# Patient Record
Sex: MALE | URBAN - METROPOLITAN AREA
[De-identification: names, ages, dates, MRNs, and addresses within clinical notes are randomized per-mention and may not be internally consistent; named-entity substitution may affect disease eponyms.]

---

## 2020-08-07 ENCOUNTER — APPOINTMENT (RX ONLY)
Dept: URBAN - METROPOLITAN AREA CLINIC 23 | Facility: CLINIC | Age: 45
Setting detail: DERMATOLOGY
End: 2020-08-07

## 2020-08-07 VITALS — TEMPERATURE: 97.2 F

## 2020-08-07 DIAGNOSIS — Z41.9 ENCOUNTER FOR PROCEDURE FOR PURPOSES OTHER THAN REMEDYING HEALTH STATE, UNSPECIFIED: ICD-10-CM

## 2020-08-07 PROCEDURE — ? PULSED-DYE LASER

## 2020-08-07 PROCEDURE — ? IN-HOUSE DISPENSING PHARMACY

## 2020-08-07 PROCEDURE — ? FILLERS

## 2020-08-07 NOTE — PROCEDURE: FILLERS
Additional Area 3 Volume In Cc: 0
Lot #: [id number]
Lot #: [id number]
Additional Anesthesia Volume In Cc: 6
Include Cannula Brand?: DermaSculpt
Include Cannula Size?: 25G
Expiration Date (Month Year): 05/19/2020
Additional Area 5 Location: cheeks (cannula used
Include Cannula Information In Note?: No
Anesthesia Volume In Cc: 0.3
Expiration Date (Month Year): 04/30/21
Additional Area 4 Location: era lobes, jawline
Additional Area 1 Location: lateral mouth, marionette lines, lateral cheeks.
Anesthesia Type: 1% lidocaine with epinephrine
Additional Area 3 Location: perioral fine lines, vermillion lips, NLFs. marionette lines upper lip lines
Lot #: [id number]
Include Cannula Length?: 1.5 inch
Additional Area 1 Location: chin
Detail Level: Detailed
Expiration Date (Month Year): 03/01/20
Additional Area 5 Location: , jawline, lateral cheeks   marionette lines
Additional Area 2 Location: oral commissures ,marionettes lines ,lips, chin lines
Consent: Written consent obtained. Risks include but not limited to bruising, beading, irregular texture, ulceration, infection, allergic reaction, scar formation, incomplete augmentation, temporary nature, procedural pain.
Lot #: [id number]
Price (Use Numbers Only, No Special Characters Or $): 0.00
Additional Area 4 Location: marionette lines
Additional Area 1 Location: lips, vermillion lips.
Post-Care Instructions: Patient instructed to apply ice to reduce swelling.
Additional Area 5 Location: oral commissures, upper lip lines, vermillion lips
Additional Area 3 Location: oral comesure,cheeks and jawline,marionette
Filler: Restylane Refyne
Map Statment: See [address] for Complete Details
Additional Area 2 Location: chin line, vermillion lips, lateral mouth, upper line brow, lateral cheeks
Additional Area 1 Volume In Cc: 1
Expiration Date (Month Year): 8/20/21
Additional Area 1 Location: using the cannula to tear through bilateral.

## 2020-08-07 NOTE — PROCEDURE: IN-HOUSE DISPENSING PHARMACY
Product 50 Refills: 0
Product 13 Refills: 2
Product 20 Unit Type: mg
Name Of Product 5: Prednisone 20mg
Product 7 Unit Type: bottle(s)
Name Of Product 2: Jered Kincaid
Product 10 Amount/Unit (Numbers Only): 5
Name Of Product 12: Skin Better Even tone Serum
Name Of Product 15: Viviscal PRO
Product 6 Price/Unit (In Dollars): 1
Name Of Product 21: Defenage skincare
Product 1 Unit Type: ml
Product 4 Unit Type: tablets
Product 7 Application Directions: Apply to each eye lashes at bedtime
Product 1 Application Directions: Apply to eyelids at bedtime as indicated.
Product 4 Application Directions: Take 1 tablet today in office post injections  for swelling as indicated and one tablet tomorrow as needed for swelling.
Product 15 Application Directions: Take one tablet twice daily for 3-6 months
Product 12 Application Directions: Apply to the entire face in the Am and Pm
Product 15 Amount/Unit (Numbers Only): [address] [facility]
Product 2 Unit Type: tube(s)
Product 8 Application Directions: Take one tablet today one hour prior to procedure as indicated.
Product 1 Price/Unit (In Dollars): 0.00
Product 2 Application Directions: Apply thin layer at bedtime only as indicated.
Product 11 Application Directions: Apply to the face once at night.
Product 14 Application Directions: Take one tablet in the Am and Pm x 3 days
Product 6 Amount/Unit (Numbers Only): [facility]
Product 17 Application Directions: Apply thin layer to face at bedtime only.
Name Of Product 8: Valium 5 mg
Product 11 Unit Type: jar(s)
Render Product Pricing In Note: No
Product 10 Application Directions: Take two tablets with water prior to procedure.
Product 9 Application Directions: Apply to affected area face am only as indicated.
Product 3 Application Directions: Take 1 tablet-today and one tablets tomorrow as indicated for swelling.
Product 13 Application Directions: Wash the face in the AM and PM
Name Of Product 6: Tretinoin 0.05% cream
Name Of Product 9: Brightening pads 6%
Detail Level: Detailed
Name Of Product 11: Hydroquinone 6% pads
Name Of Product 3: Prednisone 10 mg
Name Of Product 14: Valtrex 500mg
Name Of Product 17: Pao Dye
Product 21 Application Directions: Use as directed
Name Of Product 7: Latisse 5ml
Product 5 Application Directions: Take one tablet by mouth tomorrow as indicated for swelling
Name Of Product 10: Valium
Name Of Product 1: Latiesse 5 ml
Product 6 Unit Type: grams
Product 21 Unit Type: kit(s)
Name Of Product 13: Exfoliate Glycolic Cleanser
Product 6 Application Directions: Apply pea size amount to entire face at bedtime only.
Product 14 Amount/Unit (Numbers Only): [address]

## 2020-08-07 NOTE — PROCEDURE: PULSED-DYE LASER
Delay Time (Ms): [address] [facility]
Post-Care Instructions: I reviewed with the patient in detail post-care instructions. Patient should stay away from the sun and wear sun protection until treated areas are fully healed.
Cryogen Time (Ms): [address] [address]
Pulse Duration: 10 ms
Treated Area: small area
Spot Size: 10 mm
Delay Time (Ms): 20
Cryogen Time (Ms): 30
Detail Level: Detailed
Fluence In J/Cm2 (Optional): 7:50
Immediate Endpoint: purpura
Location (Required For Details To Render In Note But Body Touch Will Also Count For First Location): lower lids
Cryogen Time (Ms): 10
Pulse Duration: 6 ms
Consent: Written consent obtained, risks reviewed including but not limited to crusting, scabbing, blistering, scarring, darker or lighter pigmentary change, incidental hair removal, bruising, and/or incomplete removal.
Treated Area: large area
Laser Type: Vbeam 595nm
Post-Procedure Care: Post fillers bruising
Fluence In J/Cm2 (Optional): 6.50
Spot Size: 7 mm
Fluence In J/Cm2 (Optional): 6:50

## 2020-08-12 ENCOUNTER — APPOINTMENT (RX ONLY)
Dept: URBAN - METROPOLITAN AREA CLINIC 23 | Facility: CLINIC | Age: 45
Setting detail: DERMATOLOGY
End: 2020-08-12

## 2020-08-12 DIAGNOSIS — Z41.9 ENCOUNTER FOR PROCEDURE FOR PURPOSES OTHER THAN REMEDYING HEALTH STATE, UNSPECIFIED: ICD-10-CM

## 2020-08-12 PROCEDURE — ? HYALURONIDASE INJECTION

## 2020-08-12 ASSESSMENT — LOCATION SIMPLE DESCRIPTION DERM
LOCATION SIMPLE: LEFT CHEEK
LOCATION SIMPLE: RIGHT CHEEK

## 2020-08-12 ASSESSMENT — LOCATION DETAILED DESCRIPTION DERM
LOCATION DETAILED: RIGHT SUPERIOR MEDIAL MALAR CHEEK
LOCATION DETAILED: LEFT SUPERIOR MEDIAL MALAR CHEEK

## 2020-08-12 ASSESSMENT — LOCATION ZONE DERM: LOCATION ZONE: FACE

## 2020-08-12 NOTE — PROCEDURE: HYALURONIDASE INJECTION
Filler Previously Used (Optional): Restylane defund
Detail Level: Detailed
Lot # (Optional): [id number]
Total Volume (Ccs): 0.4
Administered By (Optional): Dr. Clarisa Olmedo
Expiration Date (Optional): 07/2021
Hyaluronidase Preparation: hyaluronidase 150 USP units/ml
Consent: The risks of contour defects and dimpling of the skin were reviewed with the patient prior to the injection.

## 2020-08-20 ENCOUNTER — APPOINTMENT (RX ONLY)
Dept: URBAN - METROPOLITAN AREA CLINIC 23 | Facility: CLINIC | Age: 45
Setting detail: DERMATOLOGY
End: 2020-08-20

## 2020-08-20 DIAGNOSIS — Z41.9 ENCOUNTER FOR PROCEDURE FOR PURPOSES OTHER THAN REMEDYING HEALTH STATE, UNSPECIFIED: ICD-10-CM

## 2020-08-20 PROCEDURE — ? RECOMMENDATIONS

## 2020-08-20 PROCEDURE — ? HYALURONIDASE INJECTION

## 2020-08-20 ASSESSMENT — LOCATION DETAILED DESCRIPTION DERM
LOCATION DETAILED: LEFT SUPERIOR CENTRAL MALAR CHEEK
LOCATION DETAILED: RIGHT SUPERIOR MEDIAL MALAR CHEEK

## 2020-08-20 ASSESSMENT — LOCATION SIMPLE DESCRIPTION DERM
LOCATION SIMPLE: RIGHT CHEEK
LOCATION SIMPLE: LEFT CHEEK

## 2020-08-20 ASSESSMENT — LOCATION ZONE DERM: LOCATION ZONE: FACE

## 2020-08-20 NOTE — PROCEDURE: RECOMMENDATIONS
Recommendation Preamble: The following recommendations were made during the visit: patient to monitor for any abnormalities and to follow up in 2 months
Recommendations (Free Text): Tretinoin at bedtime, alto defense BID, sunscreen, consider TNS advanced serum
Detail Level: Detailed

## 2020-08-20 NOTE — PROCEDURE: HYALURONIDASE INJECTION
Detail Level: Detailed
Consent: The risks of contour defects and dimpling of the skin were reviewed with the patient prior to the injection.
Filler Previously Used (Optional): Restylane defyne
Lot # (Optional): [id number]
Expiration Date (Optional): 07/2021
Administered By (Optional): Dr. Charlie Tijerina
Hyaluronidase Preparation: hyaluronidase 150 USP units/ml
Total Volume (Ccs): 0.5

## 2020-08-27 ENCOUNTER — APPOINTMENT (RX ONLY)
Dept: URBAN - METROPOLITAN AREA CLINIC 23 | Facility: CLINIC | Age: 45
Setting detail: DERMATOLOGY
End: 2020-08-27

## 2020-08-27 DIAGNOSIS — Z41.9 ENCOUNTER FOR PROCEDURE FOR PURPOSES OTHER THAN REMEDYING HEALTH STATE, UNSPECIFIED: ICD-10-CM

## 2020-08-27 PROCEDURE — ? HYALURONIDASE INJECTION

## 2020-08-27 ASSESSMENT — LOCATION DETAILED DESCRIPTION DERM
LOCATION DETAILED: LEFT SUPERIOR MEDIAL MALAR CHEEK
LOCATION DETAILED: RIGHT SUPERIOR MEDIAL MALAR CHEEK

## 2020-08-27 ASSESSMENT — LOCATION SIMPLE DESCRIPTION DERM
LOCATION SIMPLE: RIGHT CHEEK
LOCATION SIMPLE: LEFT CHEEK

## 2020-08-27 ASSESSMENT — LOCATION ZONE DERM: LOCATION ZONE: FACE

## 2020-08-27 NOTE — PROCEDURE: HYALURONIDASE INJECTION
Administered By (Optional): Dr. Tatyana Delong
Total Units (Leave Blank If Undesired): (0.2 cc each side )
Lot # (Optional): [id number]
Consent: The risks of contour defects and dimpling of the skin were reviewed with the patient prior to the injection.
Filler Previously Used (Optional): lip filler
Hyaluronidase Preparation: hyaluronidase 150 USP units/ml
Detail Level: Detailed
Total Volume (Ccs): 0.4
Expiration Date (Optional): 08/21

## 2020-09-18 ENCOUNTER — APPOINTMENT (RX ONLY)
Dept: URBAN - METROPOLITAN AREA CLINIC 23 | Facility: CLINIC | Age: 45
Setting detail: DERMATOLOGY
End: 2020-09-18

## 2020-09-18 VITALS — TEMPERATURE: 97.3 F

## 2021-02-11 ENCOUNTER — APPOINTMENT (RX ONLY)
Dept: URBAN - METROPOLITAN AREA CLINIC 23 | Facility: CLINIC | Age: 46
Setting detail: DERMATOLOGY
End: 2021-02-11

## 2021-02-11 VITALS — TEMPERATURE: 97.7 F

## 2021-02-11 DIAGNOSIS — Z41.9 ENCOUNTER FOR PROCEDURE FOR PURPOSES OTHER THAN REMEDYING HEALTH STATE, UNSPECIFIED: ICD-10-CM

## 2021-02-11 PROCEDURE — ? PULSED-DYE LASER

## 2021-02-11 PROCEDURE — ? PICOWAY LASER

## 2021-02-11 ASSESSMENT — LOCATION ZONE DERM: LOCATION ZONE: SCALP

## 2021-02-11 ASSESSMENT — LOCATION DETAILED DESCRIPTION DERM: LOCATION DETAILED: LEFT CENTRAL PARIETAL SCALP

## 2021-02-11 ASSESSMENT — LOCATION SIMPLE DESCRIPTION DERM: LOCATION SIMPLE: SCALP

## 2021-02-11 NOTE — PROCEDURE: PICOWAY LASER
Fluence (J/Cm2): 0.3
Wavelength: 1064 nm
Handpiece: Zoom
Spot Size: 6.0 mm
Frequency (Hz): 1Hz
Post-Care Instructions: I reviewed with the patient in detail post-care instructions. Patient should avoid sun for a minimum of 4 weeks before and after treatment.
Total Pulses: 5 HZ
Spot Size: 6.5 mm
Handpiece: Resolve
Pre-Procedure: Prior to proceeding the treatment areas were cleaned and all present put on their eye protection.
Fluence (J/Cm2): 0.4
Frequency (Hz): 4 HZ
Location Override: cheeks
Hide Pulse Duration?: No
Consent: Written consent obtained, risks reviewed including but not limited to crusting, scabbing, blistering, scarring, darker or lighter pigmentary change, paradoxical hair regrowth, incomplete removal of hair and infection.
Spot Size: 4.0 mm
Anesthesia Type: 1% lidocaine with epinephrine
Wavelength: 532 nm
Treatment Number: 0
Fluence (J/Cm2): 1.7
Post-Procedure Care: Immediate endpoint: perifollicular erythema and edema. Vaseline and ice applied. Post care reviewed with patient.
Detail Level: Detailed
Topical Anesthesia Type: 20% benzocaine, 8% lidocaine, 4% tetracaine
Treatment Number: 1

## 2021-02-11 NOTE — PROCEDURE: PULSED-DYE LASER
Detail Level: Detailed
Cryogen Time (Ms): [address] [address]
Spot Size: 10 mm
Post-Care Instructions: I reviewed with the patient in detail post-care instructions. Patient should stay away from the sun and wear sun protection until treated areas are fully healed.
Immediate Endpoint: purpura
Pulse Duration: 10 ms
Location (Required For Details To Render In Note): nose
Pulse Duration: 6 ms
Delay Time (Ms): [address] [facility]
Post-Procedure Care: Post cosmetic injections
Treated Area: medium area
Fluence In J/Cm2 (Optional): 6.50
Cryogen Time (Ms): 30
Delay Time (Ms): 20
Laser Type: Vbeam 595nm
Delay Time (Ms): 10
Location (Required For Details To Render In Note But Body Touch Will Also Count For First Location): cheeks
Delay Time (Ms): [address]
Fluence In J/Cm2 (Optional): 11.00
Spot Size: 7 mm
Immediate Endpoint: erythema
Consent: Written consent obtained, risks reviewed including but not limited to crusting, scabbing, blistering, scarring, darker or lighter pigmentary change, incidental hair removal, bruising, and/or incomplete removal.

## 2021-03-04 ENCOUNTER — APPOINTMENT (RX ONLY)
Dept: URBAN - METROPOLITAN AREA CLINIC 23 | Facility: CLINIC | Age: 46
Setting detail: DERMATOLOGY
End: 2021-03-04

## 2021-03-04 DIAGNOSIS — Z02.9 ENCOUNTER FOR ADMINISTRATIVE EXAMINATIONS, UNSPECIFIED: ICD-10-CM

## 2021-03-04 PROCEDURE — ? NO SHOW PLAN

## 2021-04-15 ENCOUNTER — APPOINTMENT (RX ONLY)
Dept: URBAN - METROPOLITAN AREA CLINIC 23 | Facility: CLINIC | Age: 46
Setting detail: DERMATOLOGY
End: 2021-04-15

## 2021-04-15 VITALS — TEMPERATURE: 97.8 F

## 2021-04-15 DIAGNOSIS — Z41.9 ENCOUNTER FOR PROCEDURE FOR PURPOSES OTHER THAN REMEDYING HEALTH STATE, UNSPECIFIED: ICD-10-CM

## 2021-04-15 PROCEDURE — ? CANDELA NORDLYS

## 2021-04-15 NOTE — PROCEDURE: CANDELA NORDLYS
Length Topical Anesthesia Applied (Optional): 30 minutes
Pulse Duration In Ms (Will Not Render If Zero): 0
Location: lower lids
Indication Override (Free Text): spot right cheek
Location: cheeks
Fluence In J/Cm2: Alyse0 Diana Quiles
[facility]
Pulse Time In Ms (Will Not Render If Zero): 10
Fluence In J/Cm2(Optional): 7.0
Hsv Prophylaxis: no
Spot Size (Optional): 1.5 nm
Pulse Time (Will Not Render If Zero): 5
Fluence In J/Cm2: 100
Pulse Duration In Ms (Will Not Render If Zero): 12
Fluence In J/Cm2(Optional): 7.00
Consent: Written consent obtained, risks reviewed including but not limited to crusting, scabbing, blistering, scarring, darker or lighter pigmentary change, and/or incomplete removal.
Hsv Prophylaxis Prescription: Valtrex 500mg BID starting the day of procedures and continuing until all sites healed
Post-Care Instructions: I reviewed with the patient in detail post-care instructions.
Spot Size (Optional): 3 nm
Passes (Will Not Render If Zero): 1
Eye Shielding Text (Leave Blank If Unwanted- Will Be Inserted If Selecting Eye Shields): The intraocular eye shields were placed. 2 drops of intraocular tetracaine HCL ophthalmic 0.5% solution was administered. The eye shields were coated with ophthalmic bacitracin prior to insertion. After the shields were removed the eyes were flushed with normal saline.
Modality: Nd:YAG
Pulse Time (Will Not Render If Zero): 17.5
Detail Level: Zone
Location: right cheek
Modality: SWT
Location: forehead
Topical Anesthesia?: 20% benzocaine, 8% lidocaine, 4% tetracaine

## 2021-05-25 ENCOUNTER — APPOINTMENT (RX ONLY)
Dept: URBAN - METROPOLITAN AREA CLINIC 23 | Facility: CLINIC | Age: 46
Setting detail: DERMATOLOGY
End: 2021-05-25

## 2021-05-25 VITALS — TEMPERATURE: 97.9 F

## 2021-05-25 DIAGNOSIS — Z41.9 ENCOUNTER FOR PROCEDURE FOR PURPOSES OTHER THAN REMEDYING HEALTH STATE, UNSPECIFIED: ICD-10-CM

## 2021-05-25 PROCEDURE — ? PICOWAY LASER

## 2021-05-25 NOTE — PROCEDURE: PICOWAY LASER
Hide Pulse Duration?: No
Fluence (J/Cm2): 0.4
Wavelength: 532 nm
Handpiece: Zoom
Handpiece: Resolve
Total Pulses: 6Hz
Post-Procedure Care: Immediate endpoint: perifollicular erythema and edema. Vaseline and ice applied. Post care reviewed with patient.
Detail Level: Detailed
Frequency (Hz): 6 HZ
Anesthesia Type: 1% lidocaine with epinephrine
Spot Size: 6.0 mm
Treatment Number: 0
Fluence (J/Cm2): 1.7
Total Pulses: Jocelin Ward
Topical Anesthesia Type: 20% benzocaine, 8% lidocaine, 4% tetracaine
Treatment Number: 1
Consent: Written consent obtained, risks reviewed including but not limited to crusting, scabbing, blistering, scarring, darker or lighter pigmentary change, paradoxical hair regrowth, incomplete removal of hair and infection.
Spot Size: 4.0 mm
Spot Size: 6.5 mm
Fluence (J/Cm2): 0.8
Wavelength: 1064 nm
Total Pulses: 1 hz.
Post-Care Instructions: I reviewed with the patient in detail post-care instructions. Patient should avoid sun for a minimum of 4 weeks before and after treatment.
Total Pulses: 5 HZ
Location Override: right cheek and dorsal nose spot
Pre-Procedure: Prior to proceeding the treatment areas were cleaned and all present put on their eye protection.

## 2021-07-14 ENCOUNTER — APPOINTMENT (RX ONLY)
Dept: URBAN - METROPOLITAN AREA CLINIC 23 | Facility: CLINIC | Age: 46
Setting detail: DERMATOLOGY
End: 2021-07-14

## 2021-07-14 DIAGNOSIS — Z41.9 ENCOUNTER FOR PROCEDURE FOR PURPOSES OTHER THAN REMEDYING HEALTH STATE, UNSPECIFIED: ICD-10-CM

## 2021-07-14 PROCEDURE — ? ADDITIONAL NOTES

## 2021-07-14 PROCEDURE — ? PICOWAY LASER

## 2021-07-14 NOTE — PROCEDURE: ADDITIONAL NOTES
Render Risk Assessment In Note?: no
Additional Notes: Recommended calming cream twice daily x1 week post procedure ( office sample small size given to patient )
Detail Level: Simple

## 2021-07-14 NOTE — PROCEDURE: PICOWAY LASER
Fluence (J/Cm2): 1.7
Handpiece: Resolve
Pre-Procedure: Prior to proceeding the treatment areas were cleaned and all present put on their eye protection.
Post-Care Instructions: I reviewed with the patient in detail post-care instructions. Patient should avoid sun for a minimum of 4 weeks before and after treatment.
Wavelength: 532 nm
Treatment Number: 3
Total Pulses: 5 HZ
Spot Size: 4.0 mm
Fluence (J/Cm2): 0.4
Spot Size: 6.5 mm
Treatment Number: 0
Hide Pulse Duration?: No
Frequency (Hz): 1 HZ
Anesthesia Type: 1% lidocaine with epinephrine
Wavelength: 1064 nm
Spot Size: 6.0 mm
Fluence (J/Cm2): 0.5
Handpiece: Zoom
Location Override: spot right lower eyelid
Detail Level: Detailed
Post-Procedure Care: Immediate endpoint: perifollicular erythema and edema. Vaseline and ice applied. Post care reviewed with patient.
Fluence (J/Cm2): 0.7
Total Pulses: Chrissy Huerta
Consent: Written consent obtained, risks reviewed including but not limited to crusting, scabbing, blistering, scarring, darker or lighter pigmentary change, paradoxical hair regrowth, incomplete removal of hair and infection.
Frequency (Hz): Ced Alejo
Topical Anesthesia Type: 20% benzocaine, 8% lidocaine, 4% tetracaine

## 2022-08-31 ENCOUNTER — APPOINTMENT (RX ONLY)
Dept: URBAN - METROPOLITAN AREA CLINIC 23 | Facility: CLINIC | Age: 47
Setting detail: DERMATOLOGY
End: 2022-08-31

## 2022-08-31 DIAGNOSIS — L73.8 OTHER SPECIFIED FOLLICULAR DISORDERS: ICD-10-CM

## 2022-08-31 DIAGNOSIS — Z71.89 OTHER SPECIFIED COUNSELING: ICD-10-CM

## 2022-08-31 DIAGNOSIS — Z41.9 ENCOUNTER FOR PROCEDURE FOR PURPOSES OTHER THAN REMEDYING HEALTH STATE, UNSPECIFIED: ICD-10-CM

## 2022-08-31 DIAGNOSIS — L81.4 OTHER MELANIN HYPERPIGMENTATION: ICD-10-CM

## 2022-08-31 PROCEDURE — ? PULSED-DYE LASER

## 2022-08-31 PROCEDURE — ? COUNSELING

## 2022-08-31 PROCEDURE — 99213 OFFICE O/P EST LOW 20 MIN: CPT

## 2022-08-31 PROCEDURE — ? RECOMMENDATIONS

## 2022-08-31 ASSESSMENT — LOCATION ZONE DERM: LOCATION ZONE: FACE

## 2022-08-31 ASSESSMENT — LOCATION DETAILED DESCRIPTION DERM: LOCATION DETAILED: RIGHT INFERIOR LATERAL FOREHEAD

## 2022-08-31 ASSESSMENT — LOCATION SIMPLE DESCRIPTION DERM: LOCATION SIMPLE: RIGHT FOREHEAD

## 2022-08-31 NOTE — PROCEDURE: PULSED-DYE LASER
Pulse Duration: 10 ms
Pulse Duration: 40 ms
Fluence In J/Cm2 (Optional): 12.00
Pulse Duration: 3 ms
Cryogen Time (Ms): [address] [address]
Cryogen Time (Ms): 10
Detail Level: Detailed
Post-Care Instructions: I reviewed with the patient in detail post-care instructions. Patient should stay away from the sun and wear sun protection until treated areas are fully healed.
Cryogen Time (Ms): 30
Delay Time (Ms): [address] [facility]
Location Override: cheeks and nose
Delay Time (Ms): 20
Spot Size: 7 mm
Treated Area: small area
Laser Type: Vbeam 595nm
Spot Size: 10x3 mm
Consent: Written consent obtained, risks reviewed including but not limited to crusting, scabbing, blistering, scarring, darker or lighter pigmentary change, incidental hair removal, bruising, and/or incomplete removal.
Price (Use Numbers Only, No Special Characters Or $): [address] [facility]
Fluence In J/Cm2 (Optional): 6:25
Delay Time (Ms): 0
Spot Size: 10 mm

## 2023-06-28 ENCOUNTER — APPOINTMENT (RX ONLY)
Dept: URBAN - METROPOLITAN AREA CLINIC 23 | Facility: CLINIC | Age: 48
Setting detail: DERMATOLOGY
End: 2023-06-28

## 2023-06-28 DIAGNOSIS — Z41.9 ENCOUNTER FOR PROCEDURE FOR PURPOSES OTHER THAN REMEDYING HEALTH STATE, UNSPECIFIED: ICD-10-CM

## 2023-06-28 PROCEDURE — ? ADDITIONAL NOTES

## 2023-06-28 PROCEDURE — ? BOTOX

## 2023-06-28 NOTE — PROCEDURE: BOTOX
Show Ucl Units: No
Additional Area 1 Units: 0
Forehead Units: [address] [facility] [place]
Additional Area 4 Location: high forehead
Show Additional Area 5: Yes
Post-Care Instructions: Patient instructed to not lie down for 4 hours and limit physical activity for 24 hours. Patient instructed not to travel by airplane for 48 hours.
Additional Area 3 Location: glabella
Detail Level: Detailed
Expiration Date (Month Year): 2024/05
Lot #: [id number]
Additional Area 6 Location: chin
Dilution (U/0.1 Cc): 2.5
Show Inventory Tab: Show
Incrementing Botox Units: By 0.5 Units
Additional Area 5 Location: lateral brows
Consent: Written consent obtained. Risks include but not limited to lid/brow ptosis, bruising, swelling, diplopia, temporary effect, incomplete chemical denervation.

## 2023-06-28 NOTE — PROCEDURE: ADDITIONAL NOTES
Render Risk Assessment In Note?: no
Additional Notes: Special Botox \\nGlabella 20 units\\nLot no: [id number]\\n04/2026
Detail Level: Simple

## 2023-07-12 ENCOUNTER — APPOINTMENT (RX ONLY)
Dept: URBAN - METROPOLITAN AREA CLINIC 23 | Facility: CLINIC | Age: 48
Setting detail: DERMATOLOGY
End: 2023-07-12

## 2023-07-12 DIAGNOSIS — Z41.9 ENCOUNTER FOR PROCEDURE FOR PURPOSES OTHER THAN REMEDYING HEALTH STATE, UNSPECIFIED: ICD-10-CM

## 2023-07-12 PROCEDURE — ? BOTOX

## 2023-07-12 NOTE — PROCEDURE: BOTOX
Additional Area 1 Units: 0
Show Additional Area 1: Yes
Show Lcl Units: No
Detail Level: Detailed
Lot #: [id number]
Additional Area 3 Location: glabella
Expiration Date (Month Year): 2024/05
Consent: Written consent obtained. Risks include but not limited to lid/brow ptosis, bruising, swelling, diplopia, temporary effect, incomplete chemical denervation.
Additional Area 2 Location: lat brows
Additional Area 6 Location: chin
Incrementing Botox Units: By 0.5 Units
Dilution (U/0.1 Cc): 2.5
Post-Care Instructions: Patient instructed to not lie down for 4 hours and limit physical activity for 24 hours. Patient instructed not to travel by airplane for 48 hours.
Show Inventory Tab: Show
Additional Area 5 Location: lateral brows
Additional Area 1 Location: high forehead ( N/C)
Additional Area 1 Units: 12
Additional Area 4 Location: high forehead

## 2023-10-18 ENCOUNTER — APPOINTMENT (RX ONLY)
Dept: URBAN - METROPOLITAN AREA CLINIC 23 | Facility: CLINIC | Age: 48
Setting detail: DERMATOLOGY
End: 2023-10-18

## 2023-10-18 DIAGNOSIS — Z41.9 ENCOUNTER FOR PROCEDURE FOR PURPOSES OTHER THAN REMEDYING HEALTH STATE, UNSPECIFIED: ICD-10-CM

## 2023-10-18 PROCEDURE — ? BOTOX

## 2023-10-18 NOTE — PROCEDURE: BOTOX
Right Pupillary Line Units: 0
Expiration Date (Month Year): 2024/05
Show Lateral Platysmal Band Units: Yes
Additional Area 6 Location: neckbands
Detail Level: Detailed
Show Mentalis Units: No
Dilution (U/0.1 Cc): 2.5
Forehead Units: [address] [facility]
Additional Area 5 Location: lateral brows
Incrementing Botox Units: By 0.5 Units
Additional Area 4 Location: neck bands
Additional Area 1 Location: High forehead
Consent: Written consent obtained. Risks include but not limited to lid/brow ptosis, bruising, swelling, diplopia, temporary effect, incomplete chemical denervation.
Show Inventory Tab: Show
Periorbital Skin Units: 10
Post-Care Instructions: Patient instructed to not lie down for 4 hours and limit physical activity for 24 hours. Patient instructed not to travel by airplane for 48 hours.
Lot #: [id number]
Additional Area 2 Location: chin

## 2023-10-31 ENCOUNTER — APPOINTMENT (RX ONLY)
Dept: URBAN - METROPOLITAN AREA CLINIC 23 | Facility: CLINIC | Age: 48
Setting detail: DERMATOLOGY
End: 2023-10-31

## 2023-10-31 DIAGNOSIS — Z41.9 ENCOUNTER FOR PROCEDURE FOR PURPOSES OTHER THAN REMEDYING HEALTH STATE, UNSPECIFIED: ICD-10-CM

## 2023-10-31 PROCEDURE — ? BOTOX

## 2023-10-31 NOTE — PROCEDURE: BOTOX
Left Periorbital Units: 0
Additional Area 3 Location: lateral eyes
Lot #: [id number]
Expiration Date (Month Year): 2024/05
Show Right And Left Pupillary Line Units: No
Show Additional Area 4: Yes
Detail Level: Detailed
Additional Area 6 Location: neckbands
Dilution (U/0.1 Cc): 2.5
Additional Area 2 Location: high forehead
Incrementing Botox Units: By 0.5 Units
Additional Area 1 Location: high forehead ( touch up)
Additional Area 5 Location: chin
Show Inventory Tab: Show
Comments: N/C
Consent: Written consent obtained. Risks include but not limited to lid/brow ptosis, bruising, swelling, diplopia, temporary effect, incomplete chemical denervation.
Post-Care Instructions: Patient instructed to not lie down for 4 hours and limit physical activity for 24 hours. Patient instructed not to travel by airplane for 48 hours.
Forehead Units: 10
Additional Area 4 Location: lateral brows

## 2024-03-12 ENCOUNTER — RX ONLY (OUTPATIENT)
Age: 49
Setting detail: RX ONLY
End: 2024-03-12

## 2024-03-12 ENCOUNTER — APPOINTMENT (RX ONLY)
Dept: URBAN - METROPOLITAN AREA CLINIC 23 | Facility: CLINIC | Age: 49
Setting detail: DERMATOLOGY
End: 2024-03-12

## 2024-03-12 DIAGNOSIS — Z41.9 ENCOUNTER FOR PROCEDURE FOR PURPOSES OTHER THAN REMEDYING HEALTH STATE, UNSPECIFIED: ICD-10-CM

## 2024-03-12 PROCEDURE — ? PICOWAY LASER

## 2024-03-12 RX ORDER — TRIAMCINOLONE ACETONIDE 5 MG/G
CREAM TOPICAL
Qty: 15 | Refills: 0 | Status: ERX | COMMUNITY
Start: 2024-03-12

## 2024-03-12 NOTE — PROCEDURE: PICOWAY LASER
Post-Procedure Care: Immediate endpoint: perifollicular erythema and edema. Vaseline and ice applied. Post care reviewed with patient.
Frequency (Hz): 6Hz
Treatment Number: 0
Treatment Number: 1
Fluence (J/Cm2): 0.5
Spot Size: 6.0 mm x 6.0 mm
Hide Pulse Duration?: No
Location Override: Spot right cheek
Wavelength: 1064 nm
Handpiece: Zoom
Handpiece: Resolve
Wavelength: 532 nm
Frequency (Hz): 6
Fluence (J/Cm2): 0.3
Frequency (Hz): 2Hz
Consent: Written consent obtained, risks reviewed including but not limited to crusting, scabbing, blistering, scarring, darker or lighter pigmentary change, paradoxical hair regrowth, incomplete removal of hair and infection.
Fluence (J/Cm2): 0.6
Price (Use Numbers Only, No Special Characters Or $): 200
Pre-Procedure: Prior to proceeding the treatment areas were cleaned and all present put on their eye protection.
Fluence (J/Cm2): 1.3
Post-Care Instructions: I reviewed with the patient in detail post-care instructions. Patient should avoid sun for a minimum of 4 weeks before and after treatment.
Location Override: Spot on right cheek
Detail Level: Zone

## 2024-05-01 ENCOUNTER — RX ONLY (OUTPATIENT)
Age: 49
Setting detail: RX ONLY
End: 2024-05-01

## 2024-05-01 RX ORDER — HYDROCORTISONE 25 MG/G
OINTMENT TOPICAL
Qty: 20 | Refills: 0 | Status: ERX | COMMUNITY
Start: 2024-05-01

## 2024-05-08 ENCOUNTER — APPOINTMENT (RX ONLY)
Dept: URBAN - METROPOLITAN AREA CLINIC 23 | Facility: CLINIC | Age: 49
Setting detail: DERMATOLOGY
End: 2024-05-08

## 2024-05-08 DIAGNOSIS — D49.2 NEOPLASM OF UNSPECIFIED BEHAVIOR OF BONE, SOFT TISSUE, AND SKIN: ICD-10-CM

## 2024-05-08 DIAGNOSIS — L24 IRRITANT CONTACT DERMATITIS: ICD-10-CM

## 2024-05-08 PROBLEM — L24.9 IRRITANT CONTACT DERMATITIS, UNSPECIFIED CAUSE: Status: ACTIVE | Noted: 2024-05-08

## 2024-05-08 PROCEDURE — 99213 OFFICE O/P EST LOW 20 MIN: CPT

## 2024-05-08 PROCEDURE — ? COUNSELING

## 2024-05-08 PROCEDURE — ? DEFER

## 2024-05-08 PROCEDURE — ? TREATMENT REGIMEN

## 2024-05-08 ASSESSMENT — LOCATION ZONE DERM: LOCATION ZONE: EYELID

## 2024-05-08 ASSESSMENT — LOCATION SIMPLE DESCRIPTION DERM: LOCATION SIMPLE: LEFT EYELID

## 2024-05-08 ASSESSMENT — LOCATION DETAILED DESCRIPTION DERM: LOCATION DETAILED: LEFT LATERAL CANTHUS

## 2024-05-08 NOTE — PROCEDURE: TREATMENT REGIMEN
Continue Regimen: Hydrocortisone 2.5% topical cream BID x 1 week only then start Tacrolimus BID x 1-2 weeks. If does not resolve, then highly recommend a biopsy to r/o AK vs SCC. Discussed with patient.
Detail Level: Zone

## 2024-05-28 ENCOUNTER — APPOINTMENT (RX ONLY)
Dept: URBAN - METROPOLITAN AREA CLINIC 23 | Facility: CLINIC | Age: 49
Setting detail: DERMATOLOGY
End: 2024-05-28

## 2024-05-28 DIAGNOSIS — L30.9 DERMATITIS, UNSPECIFIED: ICD-10-CM

## 2024-05-28 PROCEDURE — ? TREATMENT REGIMEN

## 2024-05-28 PROCEDURE — ? ADDITIONAL NOTES

## 2024-05-28 PROCEDURE — 99213 OFFICE O/P EST LOW 20 MIN: CPT | Mod: NC

## 2024-05-28 PROCEDURE — ? COUNSELING

## 2024-05-28 NOTE — PROCEDURE: MIPS QUALITY
Quality 226: Preventive Care And Screening: Tobacco Use: Screening And Cessation Intervention: Patient screened for tobacco use and is an ex/non-smoker
Detail Level: Detailed
1 or 2

## 2024-05-28 NOTE — PROCEDURE: ADDITIONAL NOTES
Additional Notes: Hylenex injected into tear troughs. Recommended plastic surgery for fat pad removal
Detail Level: Simple
Render Risk Assessment In Note?: no

## 2024-05-28 NOTE — PROCEDURE: COUNSELING
Detail Level: Zone
Patient Specific Counseling (Will Not Stick From Patient To Patient): Recommended skin biopsy, patient declined.

## 2024-05-28 NOTE — PROCEDURE: TREATMENT REGIMEN
Continue Regimen: Hydrocortisone 2.5% topical cream BID x 1 week only then start Tacrolimus BID x 1-2 weeks. If does not resolve, then highly recommend a biopsy to r/o AK vs SCC. Discussed with patient. Also discussed possible field therapy.
Initiate Treatment: Start Tacrolimus BID as needed for flares
Detail Level: Zone

## 2024-07-09 ENCOUNTER — APPOINTMENT (RX ONLY)
Dept: URBAN - METROPOLITAN AREA CLINIC 23 | Facility: CLINIC | Age: 49
Setting detail: DERMATOLOGY
End: 2024-07-09

## 2024-07-09 DIAGNOSIS — D49.2 NEOPLASM OF UNSPECIFIED BEHAVIOR OF BONE, SOFT TISSUE, AND SKIN: ICD-10-CM

## 2024-07-09 DIAGNOSIS — L81.4 OTHER MELANIN HYPERPIGMENTATION: ICD-10-CM

## 2024-07-09 DIAGNOSIS — D22 MELANOCYTIC NEVI: ICD-10-CM

## 2024-07-09 PROBLEM — D22.4 MELANOCYTIC NEVI OF SCALP AND NECK: Status: ACTIVE | Noted: 2024-07-09

## 2024-07-09 PROCEDURE — 99213 OFFICE O/P EST LOW 20 MIN: CPT | Mod: 25

## 2024-07-09 PROCEDURE — ? ADDITIONAL NOTES

## 2024-07-09 PROCEDURE — ? BIOPSY BY SHAVE METHOD

## 2024-07-09 PROCEDURE — 11102 TANGNTL BX SKIN SINGLE LES: CPT

## 2024-07-09 PROCEDURE — ? COUNSELING

## 2024-07-09 ASSESSMENT — LOCATION ZONE DERM
LOCATION ZONE: FACE
LOCATION ZONE: EYELID
LOCATION ZONE: NECK

## 2024-07-09 ASSESSMENT — LOCATION DETAILED DESCRIPTION DERM
LOCATION DETAILED: LEFT CENTRAL MALAR CHEEK
LOCATION DETAILED: LEFT CLAVICULAR NECK
LOCATION DETAILED: LEFT LATERAL CANTHUS

## 2024-07-09 ASSESSMENT — LOCATION SIMPLE DESCRIPTION DERM
LOCATION SIMPLE: LEFT ANTERIOR NECK
LOCATION SIMPLE: LEFT EYELID
LOCATION SIMPLE: LEFT CHEEK

## 2024-07-09 NOTE — PROCEDURE: ADDITIONAL NOTES
Additional Notes: Exelderma sample given for possible fungus, use 2x a day
Detail Level: Simple
Render Risk Assessment In Note?: no

## 2024-07-09 NOTE — PROCEDURE: BIOPSY BY SHAVE METHOD
Detail Level: Detailed
Depth Of Biopsy: dermis
Was A Bandage Applied: No
Size Of Lesion In Cm: 0
Biopsy Type: H and E
Biopsy Method: 15 blade
Anesthesia Type: 1% lidocaine without epinephrine
Anesthesia Volume In Cc: 0.4
Hemostasis: Electrocautery and Aluminum Chloride
Wound Care: Vaseline
Dressing: Band-Aid
Type Of Destruction Used: Curettage
Curettage Text: The wound bed was treated with curettage after the biopsy was performed.
Cryotherapy Text: The wound bed was treated with cryotherapy after the biopsy was performed.
Electrodesiccation Text: The wound bed was treated with electrodesiccation after the biopsy was performed.
Electrodesiccation And Curettage Text: The wound bed was treated with electrodesiccation and curettage after the biopsy was performed.
Silver Nitrate Text: The wound bed was treated with silver nitrate after the biopsy was performed.
Render Path Notes In Note?: Yes
Consent: The provider's intent is to obtain a tissue sample solely for diagnostic purposes.  Written consent to obtain tissue sample was obtained and risks were reviewed including but not limited to scarring, infection, bleeding, scabbing, incomplete removal, nerve damage and allergy to anesthesia.
Post-Care Instructions: I reviewed with the patient in detail post-care instructions. Patient is to keep the biopsy site dry overnight, and then apply bacitracin twice daily until healed. Patient may apply hydrogen peroxide soaks to remove any crusting.
Notification Instructions: Patient will be notified of biopsy results. However, patient instructed to call the office if not contacted within 2 weeks.
Billing Type: Third-Party Bill
Information: Selecting Yes will display possible errors in your note based on the variables you have selected. This validation is only offered as a suggestion for you. PLEASE NOTE THAT THE VALIDATION TEXT WILL BE REMOVED WHEN YOU FINALIZE YOUR NOTE. IF YOU WANT TO FAX A PRELIMINARY NOTE YOU WILL NEED TO TOGGLE THIS TO 'NO' IF YOU DO NOT WANT IT IN YOUR FAXED NOTE.

## 2024-07-19 ENCOUNTER — RX ONLY (OUTPATIENT)
Age: 49
Setting detail: RX ONLY
End: 2024-07-19

## 2024-07-19 RX ORDER — KETOCONAZOLE 20 MG/G
CREAM TOPICAL
Qty: 15 | Refills: 3 | Status: ERX | COMMUNITY
Start: 2024-07-19

## 2025-08-12 ENCOUNTER — APPOINTMENT (OUTPATIENT)
Dept: URBAN - METROPOLITAN AREA CLINIC 23 | Facility: CLINIC | Age: 50
Setting detail: DERMATOLOGY
End: 2025-08-12

## 2025-08-12 DIAGNOSIS — L81.4 OTHER MELANIN HYPERPIGMENTATION: ICD-10-CM

## 2025-08-12 DIAGNOSIS — D22 MELANOCYTIC NEVI: ICD-10-CM

## 2025-08-12 DIAGNOSIS — L72.8 OTHER FOLLICULAR CYSTS OF THE SKIN AND SUBCUTANEOUS TISSUE: ICD-10-CM

## 2025-08-12 DIAGNOSIS — Z41.9 ENCOUNTER FOR PROCEDURE FOR PURPOSES OTHER THAN REMEDYING HEALTH STATE, UNSPECIFIED: ICD-10-CM

## 2025-08-12 DIAGNOSIS — D18.0 HEMANGIOMA: ICD-10-CM

## 2025-08-12 PROBLEM — D18.01 HEMANGIOMA OF SKIN AND SUBCUTANEOUS TISSUE: Status: ACTIVE | Noted: 2025-08-12

## 2025-08-12 PROBLEM — D22.5 MELANOCYTIC NEVI OF TRUNK: Status: ACTIVE | Noted: 2025-08-12

## 2025-08-12 PROCEDURE — ? BENIGN DESTRUCTION

## 2025-08-12 PROCEDURE — ?: Mod: 25,NC

## 2025-08-12 PROCEDURE — ? BOTOX

## 2025-08-12 PROCEDURE — ?

## 2025-08-12 PROCEDURE — ? COUNSELING

## 2025-08-12 PROCEDURE — ? ACNE SURGERY

## 2025-08-12 PROCEDURE — ?: Mod: NC

## 2025-08-12 ASSESSMENT — LOCATION ZONE DERM
LOCATION ZONE: NECK
LOCATION ZONE: TRUNK

## 2025-08-12 ASSESSMENT — LOCATION SIMPLE DESCRIPTION DERM
LOCATION SIMPLE: ABDOMEN
LOCATION SIMPLE: TRAPEZIAL NECK

## 2025-08-12 ASSESSMENT — LOCATION DETAILED DESCRIPTION DERM
LOCATION DETAILED: PERIUMBILICAL SKIN
LOCATION DETAILED: RIGHT RIB CAGE
LOCATION DETAILED: MID TRAPEZIAL NECK
LOCATION DETAILED: LEFT LATERAL ABDOMEN
LOCATION DETAILED: EPIGASTRIC SKIN

## 2025-08-26 ENCOUNTER — APPOINTMENT (OUTPATIENT)
Dept: URBAN - METROPOLITAN AREA CLINIC 23 | Facility: CLINIC | Age: 50
Setting detail: DERMATOLOGY
End: 2025-08-26

## 2025-08-26 DIAGNOSIS — Z41.9 ENCOUNTER FOR PROCEDURE FOR PURPOSES OTHER THAN REMEDYING HEALTH STATE, UNSPECIFIED: ICD-10-CM

## 2025-08-26 PROCEDURE — ? ADDITIONAL NOTES
